# Patient Record
Sex: FEMALE | Race: WHITE | Employment: FULL TIME
[De-identification: names, ages, dates, MRNs, and addresses within clinical notes are randomized per-mention and may not be internally consistent; named-entity substitution may affect disease eponyms.]

---

## 2017-01-23 ENCOUNTER — RECORDS - HEALTHEAST (OUTPATIENT)
Dept: ADMINISTRATIVE | Facility: OTHER | Age: 62
End: 2017-01-23

## 2017-05-17 ENCOUNTER — COMMUNICATION - HEALTHEAST (OUTPATIENT)
Dept: INTERNAL MEDICINE | Facility: CLINIC | Age: 62
End: 2017-05-17

## 2017-06-09 ENCOUNTER — COMMUNICATION - HEALTHEAST (OUTPATIENT)
Dept: TELEHEALTH | Facility: CLINIC | Age: 62
End: 2017-06-09

## 2017-06-09 ENCOUNTER — HOSPITAL ENCOUNTER (OUTPATIENT)
Dept: MAMMOGRAPHY | Facility: CLINIC | Age: 62
Discharge: HOME | End: 2017-06-09
Attending: INTERNAL MEDICINE

## 2017-06-09 DIAGNOSIS — Z12.31 VISIT FOR SCREENING MAMMOGRAM: ICD-10-CM

## 2017-09-19 ENCOUNTER — OFFICE VISIT - HEALTHEAST (OUTPATIENT)
Dept: INTERNAL MEDICINE | Facility: CLINIC | Age: 62
End: 2017-09-19

## 2017-09-19 DIAGNOSIS — M25.561 RIGHT MEDIAL KNEE PAIN: ICD-10-CM

## 2017-09-21 ENCOUNTER — AMBULATORY - HEALTHEAST (OUTPATIENT)
Dept: INTERNAL MEDICINE | Facility: CLINIC | Age: 62
End: 2017-09-21

## 2017-09-21 ENCOUNTER — HOSPITAL ENCOUNTER (OUTPATIENT)
Dept: MRI IMAGING | Facility: CLINIC | Age: 62
Discharge: HOME | End: 2017-09-21
Attending: INTERNAL MEDICINE

## 2017-09-21 DIAGNOSIS — M25.561 RIGHT MEDIAL KNEE PAIN: ICD-10-CM

## 2017-09-21 DIAGNOSIS — S83.209A MENISCUS TEAR: ICD-10-CM

## 2017-09-27 ENCOUNTER — RECORDS - HEALTHEAST (OUTPATIENT)
Dept: ADMINISTRATIVE | Facility: OTHER | Age: 62
End: 2017-09-27

## 2017-11-09 ENCOUNTER — OFFICE VISIT - HEALTHEAST (OUTPATIENT)
Dept: INTERNAL MEDICINE | Facility: CLINIC | Age: 62
End: 2017-11-09

## 2017-11-09 DIAGNOSIS — N20.0 NEPHROLITHIASIS: ICD-10-CM

## 2017-11-09 DIAGNOSIS — Z01.818 PREOP EXAM FOR INTERNAL MEDICINE: ICD-10-CM

## 2017-11-09 DIAGNOSIS — E78.00 PURE HYPERCHOLESTEROLEMIA: ICD-10-CM

## 2017-11-09 ASSESSMENT — MIFFLIN-ST. JEOR: SCORE: 1263.49

## 2018-05-11 ENCOUNTER — OFFICE VISIT - HEALTHEAST (OUTPATIENT)
Dept: INTERNAL MEDICINE | Facility: CLINIC | Age: 63
End: 2018-05-11

## 2018-05-11 DIAGNOSIS — R07.81 RIB PAIN ON LEFT SIDE: ICD-10-CM

## 2018-05-11 LAB
ALBUMIN UR-MCNC: NEGATIVE MG/DL
APPEARANCE UR: CLEAR
BACTERIA #/AREA URNS HPF: NORMAL HPF
BILIRUB UR QL STRIP: NEGATIVE
COLOR UR AUTO: YELLOW
GLUCOSE UR STRIP-MCNC: NEGATIVE MG/DL
HGB UR QL STRIP: NEGATIVE
KETONES UR STRIP-MCNC: NEGATIVE MG/DL
LEUKOCYTE ESTERASE UR QL STRIP: NEGATIVE
NITRATE UR QL: NEGATIVE
PH UR STRIP: 7 [PH] (ref 5–8)
RBC #/AREA URNS AUTO: NORMAL HPF
SP GR UR STRIP: 1.02 (ref 1–1.03)
SQUAMOUS #/AREA URNS AUTO: NORMAL LPF
UROBILINOGEN UR STRIP-ACNC: NORMAL
WBC #/AREA URNS AUTO: NORMAL HPF

## 2018-07-09 ENCOUNTER — OFFICE VISIT - HEALTHEAST (OUTPATIENT)
Dept: FAMILY MEDICINE | Facility: CLINIC | Age: 63
End: 2018-07-09

## 2018-07-09 DIAGNOSIS — R30.0 DYSURIA: ICD-10-CM

## 2018-07-09 DIAGNOSIS — R30.0 DIFFICULT OR PAINFUL URINATION: ICD-10-CM

## 2018-07-09 DIAGNOSIS — R10.32 LLQ ABDOMINAL PAIN: ICD-10-CM

## 2018-07-09 LAB
ALBUMIN UR-MCNC: NEGATIVE MG/DL
APPEARANCE UR: CLEAR
BILIRUB UR QL STRIP: NEGATIVE
COLOR UR AUTO: YELLOW
GLUCOSE UR STRIP-MCNC: NEGATIVE MG/DL
HGB UR QL STRIP: NEGATIVE
KETONES UR STRIP-MCNC: NEGATIVE MG/DL
LEUKOCYTE ESTERASE UR QL STRIP: NEGATIVE
NITRATE UR QL: NEGATIVE
PH UR STRIP: 6.5 [PH] (ref 5–8)
SP GR UR STRIP: 1.01 (ref 1–1.03)
UROBILINOGEN UR STRIP-ACNC: NORMAL

## 2018-07-10 ENCOUNTER — HOSPITAL ENCOUNTER (OUTPATIENT)
Dept: CT IMAGING | Facility: CLINIC | Age: 63
Discharge: HOME | End: 2018-07-10

## 2018-07-10 LAB
CREAT BLD-MCNC: 0.9 MG/DL
POC GFR AMER AF HE - HISTORICAL: >60 ML/MIN/1.73M2
POC GFR NON AMER AF HE - HISTORICAL: >60 ML/MIN/1.73M2

## 2018-11-19 ENCOUNTER — COMMUNICATION - HEALTHEAST (OUTPATIENT)
Dept: INTERNAL MEDICINE | Facility: CLINIC | Age: 63
End: 2018-11-19

## 2018-11-22 RX ORDER — SERTRALINE HYDROCHLORIDE 100 MG/1
100 TABLET, FILM COATED ORAL DAILY
Qty: 30 TABLET | Refills: 0 | Status: SHIPPED | OUTPATIENT
Start: 2018-11-22 | End: 2019-11-22

## 2019-04-15 ENCOUNTER — RECORDS - HEALTHEAST (OUTPATIENT)
Dept: ADMINISTRATIVE | Facility: OTHER | Age: 64
End: 2019-04-15

## 2019-06-26 ENCOUNTER — RECORDS - HEALTHEAST (OUTPATIENT)
Dept: BONE DENSITY | Facility: CLINIC | Age: 64
End: 2019-06-26

## 2019-06-26 ENCOUNTER — HOSPITAL ENCOUNTER (OUTPATIENT)
Dept: MAMMOGRAPHY | Facility: CLINIC | Age: 64
Discharge: HOME | End: 2019-06-26
Attending: INTERNAL MEDICINE

## 2019-06-26 ENCOUNTER — RECORDS - HEALTHEAST (OUTPATIENT)
Dept: ADMINISTRATIVE | Facility: OTHER | Age: 64
End: 2019-06-26

## 2019-06-26 DIAGNOSIS — Z78.0 ASYMPTOMATIC MENOPAUSAL STATE: ICD-10-CM

## 2019-06-26 DIAGNOSIS — Z12.31 VISIT FOR SCREENING MAMMOGRAM: ICD-10-CM

## 2019-06-26 DIAGNOSIS — E55.9 VITAMIN D DEFICIENCY, UNSPECIFIED: ICD-10-CM

## 2020-02-14 ENCOUNTER — COMMUNICATION - HEALTHEAST (OUTPATIENT)
Dept: FAMILY MEDICINE | Facility: CLINIC | Age: 65
End: 2020-02-14

## 2021-05-27 ENCOUNTER — RECORDS - HEALTHEAST (OUTPATIENT)
Dept: ADMINISTRATIVE | Facility: CLINIC | Age: 66
End: 2021-05-27

## 2021-05-28 ENCOUNTER — RECORDS - HEALTHEAST (OUTPATIENT)
Dept: ADMINISTRATIVE | Facility: CLINIC | Age: 66
End: 2021-05-28

## 2021-05-29 ENCOUNTER — RECORDS - HEALTHEAST (OUTPATIENT)
Dept: ADMINISTRATIVE | Facility: CLINIC | Age: 66
End: 2021-05-29

## 2021-05-31 VITALS — BODY MASS INDEX: 23.39 KG/M2 | HEIGHT: 67 IN | WEIGHT: 149 LBS

## 2021-06-01 ENCOUNTER — RECORDS - HEALTHEAST (OUTPATIENT)
Dept: ADMINISTRATIVE | Facility: CLINIC | Age: 66
End: 2021-06-01

## 2021-06-01 VITALS — BODY MASS INDEX: 23.18 KG/M2 | WEIGHT: 148 LBS

## 2021-06-02 ENCOUNTER — RECORDS - HEALTHEAST (OUTPATIENT)
Dept: ADMINISTRATIVE | Facility: CLINIC | Age: 66
End: 2021-06-02

## 2021-06-06 NOTE — TELEPHONE ENCOUNTER
Left voicemail for patient to return call to clinic. When patient returns call, please give them below message.    Yvonne Hidalgo CMA ............... 2:17 PM, 02/17/20

## 2021-06-06 NOTE — TELEPHONE ENCOUNTER
Patient has not been seen in over one year. Will need to establish care with a new physician now that Dr. Loja is no longer here. Please help schedule. We may be able to get a bridged refill once scheduled.  Yvonne Hidalgo CMA ............... 10:47 AM, 02/17/20

## 2021-06-06 NOTE — TELEPHONE ENCOUNTER
Former patient of [facility] & has not established care with another provider.  Please assign refill request to covering provider per Clinic standard process.

## 2021-06-13 NOTE — PROGRESS NOTES
Clinic Note    Assessment:     Assessment and Plan:  1. Right medial knee pain  I think is very likely that she has some significant meniscal pathology.  We will get an MRI and then plan either physical therapy or orthopedic visit.    2.  Fatigue- labs have been normal and is probably sleep-related.  However, certainly could be causing this to be worse.  She does not feel like she needs it.  Will decrease to 50 mg daily for 1 month then 50 every other day for 1 month and stop.    Vitamin D deficiency- she has not been taking daily.  Will put her on 4000 units daily and recheck when she comes back in December.       Patient Instructions   MRI knee     Ibuprofen 600 mg as needed for pain    Vitamin D3 4000 units daily and we will check again at physical in December.     Return in about 3 months (around 12/19/2017) for Annual physical.         Subjective:      Emily Mckay is a 61 y.o. female comes in stating that she has developed some significant right knee pain.  History is that back in high school when she was young she was downhill skiing and had an injury that caused dramatic swelling and pain in her knee.  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  She tried skiing afterwards but it was painful.  She was not active doing other sports.  No other trauma or irritation that she is aware of.  Over time she did develop some painful symptoms and they would wax and wane.  Over the past year her symptoms have been getting worse.  She states that she has to limp at times because it severe and at times it is so severe she is going up or down steps that it takes her breath away.  There are times it seems like it is less stable and or locking.  It is not swollen at all during any of these times.  There is no heat erythema etc.  She has some stiffness in that knee is somewhat uncomfortable with kneeling.  As mentioned she limps at times.        The following portions of the patient's history were reviewed and updated as  appropriate: Past medical history, allergies, medicines reviewed her labs over the last year and everything looks great except her vitamin D level.  CT heart scan score of 14 and she is not on a statin.  She is on sertraline.    Review of Systems:    As above.  Patient has not been taking vitamin D.  She feels very tired.  She is working too hard and not getting enough sleep.  History   Smoking Status     Former Smoker     Start date: 5/26/2007     Quit date: 5/27/2007   Smokeless Tobacco     Not on file     Comment: Does occassionally chew nicotine gum         Objective:     Vitals:    09/19/17 1503   BP: 124/68   Pulse: 84       Exam:  Vital signs stable patient looks well in no acute distress  Left knee totally normal  Right knee there is some mild tenderness in the medial joint line.  There is no swelling fusion erythema warmth or crepitance.  There is a little bit of lip to the tibial plateau on the medial aspect compared to the left.  No tenderness of the S anserine bursa.  Testing of the ACL PCL medial and lateral collateral ligaments all appear to be intact.  Range of motion is decreased with some pain and decreased flexion.  Left knee gets with her heel just about to her thigh right side less than that.    Patient Active Problem List   Diagnosis     Pure hypercholesterolemia- CAC 14 (12/2016)     Vitamin D Deficiency     Urinary calculus, unspecified     Current Outpatient Prescriptions   Medication Sig Dispense Refill     SERTRALINE HCL (SERTRALINE ORAL) Take 100 mg by mouth.        No current facility-administered medications for this visit.          Wayne Loja    9/19/2017

## 2021-06-14 NOTE — PROGRESS NOTES
ASSESSMENT:    1. Preop exam for internal medicine  No contraindication for the surgical procedure    2. Nephrolithiasis  We will check lab work today but she has been fine and stable.  Stay well-hydrated  - Basic Metabolic Panel  - HM2(CBC w/o Differential)  - Urinalysis    3. Pure hypercholesterolemia- CAC 14 (12/2016)  She is not on a statin and will plan on repeating a three-year interval.  Follow good and appropriate diet.    We did use this as her yearly physical as well.  She should follow-up in 1 year.    PLAN:  Patient Instructions   My recommendation is you have the shingles vaccination completed somewhere.      Orders Placed This Encounter   Procedures     Basic Metabolic Panel     HM2(CBC w/o Differential)     Urinalysis     No Follow-up on file.    ASSESSED PROBLEMS:  Problem List Items Addressed This Visit     Pure hypercholesterolemia- CAC 14 (12/2016)    Nephrolithiasis    Relevant Orders    Basic Metabolic Panel    HM2(CBC w/o Differential)    Urinalysis      Other Visit Diagnoses     Preop exam for internal medicine    -  Primary          CHIEF COMPLAINT:  Chief Complaint   Patient presents with     Pre-op Exam     Right Knee repair-[facility]- 11/14/2017       HISTORY OF PRESENT ILLNESS:  Emily Mckay is a 62 y.o. female here for an internal medicine pre-operative consultation. The exam is requested by Dr. Velazquez in preparation for Right knee repair to be performed at  [facility] on 11/17/2017. Symptoms are significant chronic pain in the right knee which is been evaluated by or so and found to have a meniscal tear causing persistent symptoms.  She partially responded to steroids injection.  This can have repair.  Left knee somewhat painful but nowhere near the right sided pain.  Otherwise she has been fine and without symptoms problems or concerns.       Emily Mckay has tolerated previous surgeries well without bleeding or anesthesia  difficulty.     Past/Current Medical Problems:  Previous bleeding disorders: Negative    History of anesthesia reactions: Negative    Past Medical History:   Diagnosis Date     PONV (postoperative nausea and vomiting)      Situational depression          REVIEW OF SYSTEMS:   Constitutional: Negative  Eyes: Negative  ENT: Negative  Respiratory: Negative  Breast/Skin: Negative  Cardiovascular:Negative   GI: Negative  Urinary: Negative  Reproductive: Negative  Musculoskeletal: right knee pain  Neuro: Negative  Endocrine: Negative  Mental Health: Negative   Lymph: Negative  Heme: Negative     Remaining Review of Systems negative.    QUESTIONS/HISTORY PERTINENT TO PRE-OP:  Body Piercings: ears, patient will remove    Family history of bleeding disorders: Negative    Family history of anesthesia reactions: Negative      Surgeries:  Past Surgical History:   Procedure Laterality Date     APPENDECTOMY       CYSTOSCOPY W/ URETERAL STENT PLACEMENT Left 5/27/2016    Procedure: CYSTOSCOPY, LEFT URETEROSCOPY LASER LITHOTRIPSY, STENT INSERTION;  Surgeon: Ramón Collins MD;  [facility]: [facility];  Service:      Fallopian Tube Cyst Removal       history of nephrostomy tube Left      MYOMECTOMY       REDUCTION MAMMAPLASTY  2000     URETEROSCOPY         Family History:   Family History   Problem Relation Age of Onset     Prostate cancer Father      Liver cancer Maternal Grandmother      Lung cancer Maternal Grandfather      Urolithiasis Son      recurrent     Heart attack Brother        Social History:  Social History     Social History     Marital status:      Spouse name: N/A     Number of children: N/A     Years of education: N/A     Occupational History           Social History Main Topics     Smoking status: Former Smoker     Start date: 5/26/2007     Quit date: 5/27/2007     Smokeless tobacco: Never Used      Comment: Does occassionally chew nicotine gum     Alcohol use 2.4 oz/week     4 Shots of  "liquor per week      Comment: 4 per week     Drug use: No     Sexual activity: Yes     Partners: Male     Other Topics Concern     Not on file     Social History Narrative       VITALS:  Vitals:    11/09/17 0953   BP: 126/64   Pulse: 72   Weight: 149 lb (67.6 kg)   Height: 5' 7\" (1.702 m)     Wt Readings from Last 3 Encounters:   11/09/17 149 lb (67.6 kg)   11/08/16 140 lb 11.2 oz (63.8 kg)   05/27/16 137 lb (62.1 kg)     Body mass index is 23.34 kg/(m^2).    PHYSICAL EXAM:  General Appearance: Alert, cooperative, no distress, appears stated age.  HEENT: EMOI,  Neck: Supple without adenopathy   Back: No CVA tenderness   Lungs: Clear to auscultation bilaterally, good air movement.  Heart: Regular rate and rhythm, S1 and S2 normal, no murmur or bruit.  Abdomen: Soft, non-tender, no HSM or masses.  Breast-status post reduction surgery and no significant masses or concerns.  She was taught how to do proper breast exam herself.  Musculoskeletal: No gross abnormalities.  Extremities: No CCE, pulses II/IV and symmetric,   Skin: No worrisome lesions noted..  Neurologic: CNII-XII intact, strength V/V and symmetric, DTRs II/IV and symmetric, sensory grossly intact  Psychiatric: Normal mood and affect.       MEDICATIONS:  Current Outpatient Prescriptions   Medication Sig Dispense Refill     sertraline (ZOLOFT) 100 MG tablet Take 1 tablet (100 mg total) by mouth daily. 90 tablet 4     No current facility-administered medications for this visit.        ALLERGIES:  No Known Allergies  "

## 2021-06-17 NOTE — PROGRESS NOTES
ASSESSMENT/PLAN:  1. Rib pain on left side  I believe that this is rib pain in the cartilage area in the left side.  This is evidenced by putting pressure on the lateral chest and rib area creating the symptom in the left anterior ribs margin.  There was no obvious trauma to cause this although she does take care of her young grandchild.  Urine is negative.  She does have a history of having a cyst on the left kidney which would have at least some potential to cause symptoms if there was some swelling hemorrhage etc.  UA was negative.  Patient was instructed that she can use ibuprofen and heat and if her symptoms persist or not improving in 2 weeks then consider getting CT scan to follow-up on the cyst.  She agrees.  Nothing else concerning in history or physical.  - Urinalysis Macro & Micro      Patient Instructions   Please note that if over the counter medications were taken off of your medication list, it is not because you are being asked to stop taking them.  does not want all of the over the counter medications on your medication list, as it bogs down the list.     Please call your insurance company and discuss Shingrix vaccine. This is a series of 2 injections that MUST be given 2-6 months apart and will cost around $287.00 here at [facility].    Due for your mammogram - [phone]    Repeat CT Heart Scan for calcium score in December 2019, [facility]- #[phone] $100 out of pocket to determine if you need to use/increase statin medicine to decrease risk of stroke and heart attack.     Ibuprofen 400-600 mg three times daily as needed.     Heat for 15-20 minutes 2 or 3 times daily.     Send a [employer] message in two weeks letting Dr. Loja know how you are doing.         Return if symptoms worsen or fail to improve.    CHIEF COMPLAINT:  Chief Complaint   Patient presents with     concerns of a dull pain     upper left abd area       HISTORY OF PRESENT ILLNESS:  Emily SALAS  Woody is a 62 y.o. female presenting to the clinic today with complaints of abdominal discomfort.     Abdominal Discomfort: She has been experiencing pain in her left upper quadrant just under the rib margin for the past ten days or so. She did not feel the pain when she went to bed last night, and she almost cancelled the appointment today, but she kept it because the pain had come and gone previously. The pain is in fact back today and she currently has some discomfort in the office. It seems to keep coming back worse each time. She has tried altering her diet, taking gas pills, and taking antacids, but that has not helped. She denies fever or chills. There was not trauma or strain to the abdomen that she knows of. She has not had diverticulitis in the past. She had a colonoscopy completed in 2017. She does not have symptoms with changes in position. She has all of her abdominal organs besides her appendix. She does note having the discomfort in her left upper quadrant with percussion of the left CVA area. She has had kidney stones in the past, but this pain does not feel like that at all. She thinks all of her stones have been removed. Her July 2016 CT of the abdomen showed no kidney stones, but there was a left renal cyst. She has tried taking ibuprofen and that seems to help the discomfort.     REVIEW OF SYSTEMS:   She denies fever or chills. Comprehensive review of systems negative except as noted above.    PFSH:  She is s/p appendectomy.     TOBACCO USE:  History   Smoking Status     Former Smoker     Start date: 5/26/2007     Quit date: 5/27/2007   Smokeless Tobacco     Never Used     Comment: Does occassionally chew nicotine gum       VITALS:  Vitals:    05/11/18 0951   BP: 122/64   Pulse: 75     Wt Readings from Last 3 Encounters:   11/09/17 149 lb (67.6 kg)   11/08/16 140 lb 11.2 oz (63.8 kg)   05/27/16 137 lb (62.1 kg)     There is no height or weight on file to calculate BMI.    PHYSICAL EXAM:  General  Appearance: Alert, cooperative, no distress, appears stated age.  Back: No CVA tenderness but notes some discomfort in the LUQ with percussion of the left CVA  Abdomen: Soft nontender no HSM or masses.  Left rib margin is not directly tender to palpation rather when I push laterally there is tenderness anteriorly and not under my hand.  This is suspicious for rib related cartilage related trauma or injury.  No masses.  Chest: As above pressure on the sternum does not re-create symptoms.  Lungs are clear.  No rub.  Psychiatric: she has a normal mood and affect.     Notes Reviewed, additional history from source other than patient (2 TOTAL): None.    Accessed Care Everywhere, Requested Records, Consult with Physician (1 TOTAL): None.     Radiology tests summarized or ordered (XR, CT, MRI, DXA, US): Reviewed 7/7/2016 CT abdomen - no stones, left renal cyst.     Labs reviewed or ordered (1 TOTAL): Urine ordered.     Medicine tests reviewed or ordered (ECG, echocardiogram, colonoscopy, EGD, venous US) (1 TOTAL): None.    Independent review of ECG or XR (2 EACH): None.    MEDICATIONS:  Current Outpatient Prescriptions   Medication Sig Dispense Refill     sertraline (ZOLOFT) 100 MG tablet Take 1 tablet (100 mg total) by mouth daily. 90 tablet 4     No current facility-administered medications for this visit.        The visit lasted a total of 13 minutes face to face with the patient. Over 50% of the time was spent counseling and educating the patient about her abdominal discomfort.    I, Fernie Gilliland, am scribing for and in the presence of, Dr. Loja.    I, Dr. Loja, personally performed the services described in this documentation, as scribed by Fernie Gilliland in my presence, and it is both accurate and complete.    Dragon dictation was used for this note.  Speech recognition errors are a possibility.      Total data points: 2

## 2021-06-19 NOTE — PROGRESS NOTES
Assessment:   The encounter diagnosis was Difficult or painful urination.     Plan:   No medications were ordered this encounter    Patient Instructions     Based on the information provided, I believe you need to be seen immediately.  Please go to urgent care as soon as possible.    You will not be charged for this eVisit.    Return for further follow up if needed. Call [phone]) or schedule an appointment via [facility]    Subjective:   Emily Mckay is a 62 y.o. female who submitted an eVisit request for evaluation of her Dysuria.  See the questionnaire and message section of encounter report for information related to history of present illness and review of systems.    The following portions of the patient's history were reviewed and updated as appropriate:  She  does not have any pertinent problems on file.  She has No Known Allergies..     Objective:   No exam performed today, patient submitted as eVisit.

## 2021-06-26 ENCOUNTER — HEALTH MAINTENANCE LETTER (OUTPATIENT)
Age: 66
End: 2021-06-26

## 2021-06-26 NOTE — PROGRESS NOTES
Progress Notes by Tonja Wagoner PA-C at 7/9/2018 11:40 AM     Author: Tonja Wagoner PA-C Service: -- Author Type: Physician Assistant    Filed: 7/9/2018  2:37 PM Encounter Date: 7/9/2018 Status: Signed    : Tonja Wagoner PA-C (Physician Assistant)       Subjective:      Patient ID: Emily Mckay is a 62 y.o. female.    Chief Complaint:    HPI Emily Mckay is a 62 y.o. female past medical history of kidney stones and diverticuli who presents today complaining of dysuria, urinary urgency, and left lower abdominal pain x 1 day. It's not a severe side pain, but it is tender when pressed on. She states that this does not feel similar to her stones in the past; her most recent CT was 6/16. Her dysuria is mostly an exaggerated sensation, and this feels similar to having a UTI in the past.  Reports that she is also been going more frequently.  She denies any flank pain, hematuria, vaginal symptoms such as vaginal bleeding, discharge, or pain.  She has not had any fevers, nausea, vomiting, diarrhea, constipation.          Social History   Substance Use Topics   ? Smoking status: Former Smoker     Start date: 5/26/2007     Quit date: 5/27/2007   ? Smokeless tobacco: Never Used      Comment: Does occassionally chew nicotine gum   ? Alcohol use 2.4 oz/week     4 Shots of liquor per week      Comment: 4 per week       Review of Systems   Constitutional: Negative for fever.   Gastrointestinal: Positive for abdominal pain (LLQ). Negative for constipation, diarrhea, nausea and vomiting.   Genitourinary: Positive for dysuria and frequency. Negative for flank pain, hematuria, vaginal bleeding, vaginal discharge and vaginal pain.       Objective:     /76  Pulse 76  Temp 97.7  F (36.5  C) (Oral)   Resp 14  Wt 148 lb (67.1 kg)  LMP 06/09/2008  SpO2 98%  BMI 23.18 kg/m2    Physical Exam   Constitutional: She appears well-developed and well-nourished. No distress.   HENT:   Head: Normocephalic and  atraumatic.   Right Ear: External ear normal.   Left Ear: External ear normal.   Eyes: Conjunctivae are normal.   Cardiovascular: Normal rate, regular rhythm and normal heart sounds.  Exam reveals no gallop and no friction rub.    No murmur heard.  Pulmonary/Chest: Effort normal and breath sounds normal. No respiratory distress. She has no wheezes. She has no rales.   Abdominal: Soft. She exhibits no distension. There is tenderness in the left lower quadrant. There is no rebound and no guarding.   Skin: She is not diaphoretic.   Psychiatric: She has a normal mood and affect. Her behavior is normal. Judgment and thought content normal.   Nursing note and vitals reviewed.    Labs:  Recent Results (from the past 24 hour(s))   Urinalysis-UC if Indicated   Result Value Ref Range    Color, UA Yellow Colorless, Yellow, Straw, Light Yellow    Clarity, UA Clear Clear    Glucose, UA Negative Negative    Bilirubin, UA Negative Negative    Ketones, UA Negative Negative    Specific Gravity, UA 1.015 1.005 - 1.030    Blood, UA Negative Negative    pH, UA 6.5 5.0 - 8.0    Protein, UA Negative Negative mg/dL    Urobilinogen, UA 0.2 E.U./dL 0.2 E.U./dL, 1.0 E.U./dL    Nitrite, UA Negative Negative    Leukocytes, UA Negative Negative     Clinical Decision Making:  Patient's UA was completely clean, low suspicion for kidney stone or UTI with this normal UA.  Considering her most recent CT did show diverticuli without any diverticulitis, and she is significantly tender in the lower left quadrant I have a higher suspicion for diverticulitis. I planned to get an abdominal CT to confirm this today, but there were no time slots available.  The patient was started on treatment for diverticulitis today, and she is scheduled to have her CT tomorrow morning.  Carole Suresh will be following up on this imaging study.  If the CT does not show any signs of diverticulitis I have instructed the patient to discontinue the antibiotic regimen.   Differential diagnosis includes but is not limited to kidney stone, ovarian cyst, muscular cramping, or colonic mass.     Assessment:     Procedures    1. LLQ abdominal pain  CT Abdomen Pelvis With Oral With Without IV Contrast    ciprofloxacin HCl (CIPRO) 500 MG tablet    metroNIDAZOLE (FLAGYL) 500 MG tablet   2. Dysuria  Urinalysis-UC if Indicated         Patient Instructions   1. Take Cipro 500 mg every 12 hours for the next 7 days. Take Metronidazole 500 mg every 8 hours for the next 7 days. Follow up for the CT tomorrow morning 7:40AM at [facility]. Nothing to eat or drink 2 hours prior to the image. If the CT does not show diverticulitis I will have you discontinue the antibiotic regimen.   3. Take Tylenol as needed for pain control.  4. Start a clear liquid diet for the next 24 hours, advance your diet as tolerated. Start advancing this thick liquids then soft solids.   5. Follow up with your primary care provider if pain is not resolving  6.You do not need to avoid seeds, nuts, popcorn, or other similar foods after this resolves. This was once thought to be true for diverticulitis prevention, but research has shown that not to be true.

## 2021-08-21 ENCOUNTER — HEALTH MAINTENANCE LETTER (OUTPATIENT)
Age: 66
End: 2021-08-21

## 2021-10-16 ENCOUNTER — HEALTH MAINTENANCE LETTER (OUTPATIENT)
Age: 66
End: 2021-10-16

## 2022-01-17 ENCOUNTER — HOSPITAL ENCOUNTER (OUTPATIENT)
Dept: MAMMOGRAPHY | Facility: CLINIC | Age: 67
Discharge: HOME | End: 2022-01-17
Attending: INTERNAL MEDICINE | Admitting: INTERNAL MEDICINE
Payer: COMMERCIAL

## 2022-01-17 DIAGNOSIS — Z12.31 SCREENING MAMMOGRAM, ENCOUNTER FOR: ICD-10-CM

## 2022-01-17 PROCEDURE — 77067 SCR MAMMO BI INCL CAD: CPT

## 2022-07-06 ENCOUNTER — ANCILLARY PROCEDURE (OUTPATIENT)
Dept: BONE DENSITY | Facility: CLINIC | Age: 67
End: 2022-07-06
Attending: INTERNAL MEDICINE
Payer: COMMERCIAL

## 2022-07-06 DIAGNOSIS — M81.0 OSTEOPOROSIS: ICD-10-CM

## 2022-07-06 PROCEDURE — 77080 DXA BONE DENSITY AXIAL: CPT | Mod: TC | Performed by: RADIOLOGY

## 2022-07-23 ENCOUNTER — HEALTH MAINTENANCE LETTER (OUTPATIENT)
Age: 67
End: 2022-07-23

## 2022-10-01 ENCOUNTER — HEALTH MAINTENANCE LETTER (OUTPATIENT)
Age: 67
End: 2022-10-01

## 2023-02-04 ENCOUNTER — HEALTH MAINTENANCE LETTER (OUTPATIENT)
Age: 68
End: 2023-02-04

## 2023-05-13 ENCOUNTER — HEALTH MAINTENANCE LETTER (OUTPATIENT)
Age: 68
End: 2023-05-13

## 2024-03-03 ENCOUNTER — HEALTH MAINTENANCE LETTER (OUTPATIENT)
Age: 69
End: 2024-03-03

## 2024-07-20 ENCOUNTER — HEALTH MAINTENANCE LETTER (OUTPATIENT)
Age: 69
End: 2024-07-20

## 2025-03-15 ENCOUNTER — HEALTH MAINTENANCE LETTER (OUTPATIENT)
Age: 70
End: 2025-03-15

## 2025-04-24 ENCOUNTER — HOSPITAL ENCOUNTER (OUTPATIENT)
Dept: NUCLEAR MEDICINE | Facility: HOSPITAL | Age: 70
Discharge: HOME | End: 2025-04-24
Attending: INTERNAL MEDICINE
Payer: COMMERCIAL

## 2025-04-24 DIAGNOSIS — E21.3 HYPERPARATHYROIDISM: ICD-10-CM

## 2025-04-24 DIAGNOSIS — E83.52 HYPERCALCEMIA: ICD-10-CM

## 2025-04-24 PROCEDURE — A9516 IODINE I-123 SOD IODIDE MIC: HCPCS | Performed by: INTERNAL MEDICINE

## 2025-04-24 PROCEDURE — 343N000001 HC RX 343 MED OP 636: Performed by: INTERNAL MEDICINE

## 2025-04-24 PROCEDURE — 78070 PARATHYROID PLANAR IMAGING: CPT

## 2025-04-24 PROCEDURE — A9500 TC99M SESTAMIBI: HCPCS | Performed by: INTERNAL MEDICINE

## 2025-04-24 RX ADMIN — Medication 241 MICROCURIE: at 08:21

## 2025-04-24 RX ADMIN — Medication 24.8 MILLICURIE: at 07:55

## 2025-05-05 ENCOUNTER — HOSPITAL ENCOUNTER (OUTPATIENT)
Dept: MAMMOGRAPHY | Facility: CLINIC | Age: 70
Discharge: HOME | End: 2025-05-05
Attending: INTERNAL MEDICINE | Admitting: INTERNAL MEDICINE
Payer: COMMERCIAL

## 2025-05-05 DIAGNOSIS — Z12.31 VISIT FOR SCREENING MAMMOGRAM: ICD-10-CM

## 2025-05-05 PROCEDURE — 77067 SCR MAMMO BI INCL CAD: CPT
